# Patient Record
Sex: MALE | Race: WHITE | Employment: FULL TIME | URBAN - METROPOLITAN AREA
[De-identification: names, ages, dates, MRNs, and addresses within clinical notes are randomized per-mention and may not be internally consistent; named-entity substitution may affect disease eponyms.]

---

## 2018-11-20 ENCOUNTER — NURSE TRIAGE (OUTPATIENT)
Dept: NURSING | Facility: CLINIC | Age: 38
End: 2018-11-20

## 2018-11-21 NOTE — TELEPHONE ENCOUNTER
"Caller's wife calling:  \"He got something in his eye yesterday in the woods and it's been irritating him all day\".  Caller is reporting she can see a \"white icky growth\" under his eye lid.   Bertin refuses to come to phone to go through triage.    Encouraged caller to have him seen in ER tonight.    Nell Mcdermott RN  [facility]      "